# Patient Record
Sex: FEMALE | Race: WHITE | URBAN - METROPOLITAN AREA
[De-identification: names, ages, dates, MRNs, and addresses within clinical notes are randomized per-mention and may not be internally consistent; named-entity substitution may affect disease eponyms.]

---

## 2023-07-14 ENCOUNTER — OFFICE (OUTPATIENT)
Dept: URBAN - METROPOLITAN AREA PATHOLOGY 4 | Facility: PATHOLOGY | Age: 68
End: 2023-07-14

## 2023-07-14 ENCOUNTER — ON CAMPUS - OUTPATIENT (OUTPATIENT)
Dept: URBAN - METROPOLITAN AREA HOSPITAL 2 | Facility: HOSPITAL | Age: 68
End: 2023-07-14

## 2023-07-14 VITALS
SYSTOLIC BLOOD PRESSURE: 122 MMHG | HEIGHT: 56 IN | SYSTOLIC BLOOD PRESSURE: 113 MMHG | DIASTOLIC BLOOD PRESSURE: 70 MMHG | SYSTOLIC BLOOD PRESSURE: 149 MMHG | HEART RATE: 77 BPM | DIASTOLIC BLOOD PRESSURE: 77 MMHG | TEMPERATURE: 97.1 F | DIASTOLIC BLOOD PRESSURE: 92 MMHG | RESPIRATION RATE: 18 BRPM | WEIGHT: 113 LBS | OXYGEN SATURATION: 100 % | HEART RATE: 78 BPM | SYSTOLIC BLOOD PRESSURE: 148 MMHG | HEART RATE: 68 BPM | OXYGEN SATURATION: 99 % | RESPIRATION RATE: 19 BRPM | DIASTOLIC BLOOD PRESSURE: 63 MMHG | SYSTOLIC BLOOD PRESSURE: 123 MMHG | DIASTOLIC BLOOD PRESSURE: 67 MMHG | DIASTOLIC BLOOD PRESSURE: 87 MMHG | DIASTOLIC BLOOD PRESSURE: 81 MMHG | SYSTOLIC BLOOD PRESSURE: 107 MMHG | HEART RATE: 79 BPM | HEART RATE: 84 BPM | RESPIRATION RATE: 169 BRPM | HEART RATE: 76 BPM | DIASTOLIC BLOOD PRESSURE: 64 MMHG | SYSTOLIC BLOOD PRESSURE: 126 MMHG | HEART RATE: 66 BPM | HEART RATE: 67 BPM

## 2023-07-14 DIAGNOSIS — K20.0 EOSINOPHILIC ESOPHAGITIS: ICD-10-CM

## 2023-07-14 DIAGNOSIS — K31.9 DISEASE OF STOMACH AND DUODENUM, UNSPECIFIED: ICD-10-CM

## 2023-07-14 DIAGNOSIS — K29.80 DUODENITIS WITHOUT BLEEDING: ICD-10-CM

## 2023-07-14 DIAGNOSIS — D12.5 BENIGN NEOPLASM OF SIGMOID COLON: ICD-10-CM

## 2023-07-14 DIAGNOSIS — K44.9 DIAPHRAGMATIC HERNIA WITHOUT OBSTRUCTION OR GANGRENE: ICD-10-CM

## 2023-07-14 DIAGNOSIS — K22.2 ESOPHAGEAL OBSTRUCTION: ICD-10-CM

## 2023-07-14 DIAGNOSIS — Z86.010 PERSONAL HISTORY OF COLONIC POLYPS: ICD-10-CM

## 2023-07-14 DIAGNOSIS — R13.10 DYSPHAGIA, UNSPECIFIED: ICD-10-CM

## 2023-07-14 DIAGNOSIS — K31.89 OTHER DISEASES OF STOMACH AND DUODENUM: ICD-10-CM

## 2023-07-14 DIAGNOSIS — K26.9 DUODENAL ULCER, UNSPECIFIED AS ACUTE OR CHRONIC, WITHOUT HEM: ICD-10-CM

## 2023-07-14 PROBLEM — K63.5 POLYP OF COLON: Status: ACTIVE | Noted: 2023-07-14

## 2023-07-14 PROBLEM — K22.89 OTHER SPECIFIED DISEASE OF ESOPHAGUS: Status: ACTIVE | Noted: 2023-07-14

## 2023-07-14 LAB
GI HISTOLOGY: A. UNSPECIFIED: (no result)
GI HISTOLOGY: B. SELECT: (no result)
GI HISTOLOGY: C. UNSPECIFIED: (no result)
GI HISTOLOGY: D. UNSPECIFIED: (no result)
GI HISTOLOGY: PDF REPORT: (no result)

## 2023-07-14 PROCEDURE — 45385 COLONOSCOPY W/LESION REMOVAL: CPT | Mod: PT | Performed by: INTERNAL MEDICINE

## 2023-07-14 PROCEDURE — 88305 TISSUE EXAM BY PATHOLOGIST: CPT | Mod: 26 | Performed by: INTERNAL MEDICINE

## 2023-07-14 PROCEDURE — 43450 DILATE ESOPHAGUS 1/MULT PASS: CPT | Performed by: INTERNAL MEDICINE

## 2023-07-14 PROCEDURE — 43239 EGD BIOPSY SINGLE/MULTIPLE: CPT | Performed by: INTERNAL MEDICINE

## 2023-07-14 RX ORDER — OMEPRAZOLE 40 MG/1
40 CAPSULE, DELAYED RELEASE ORAL
Qty: 30 | Refills: 11 | Status: ACTIVE
Start: 2023-07-14

## 2023-08-01 ENCOUNTER — APPOINTMENT (OUTPATIENT)
Dept: GENERAL RADIOLOGY | Facility: HOSPITAL | Age: 68
End: 2023-08-01
Payer: MEDICARE

## 2023-08-01 ENCOUNTER — HOSPITAL ENCOUNTER (OUTPATIENT)
Facility: HOSPITAL | Age: 68
Discharge: HOME | End: 2023-08-01
Attending: PEDIATRICS | Admitting: PEDIATRICS
Payer: MEDICARE

## 2023-08-01 VITALS
BODY MASS INDEX: 24.98 KG/M2 | WEIGHT: 119 LBS | RESPIRATION RATE: 18 BRPM | SYSTOLIC BLOOD PRESSURE: 134 MMHG | HEIGHT: 58 IN | HEART RATE: 72 BPM | OXYGEN SATURATION: 97 % | DIASTOLIC BLOOD PRESSURE: 75 MMHG | TEMPERATURE: 98.4 F

## 2023-08-01 DIAGNOSIS — M79.671 RIGHT FOOT PAIN: Primary | ICD-10-CM

## 2023-08-01 PROCEDURE — 73620 X-RAY EXAM OF FOOT: CPT

## 2023-08-01 PROCEDURE — G0463 HOSPITAL OUTPT CLINIC VISIT: HCPCS | Performed by: PEDIATRICS

## 2023-08-01 PROCEDURE — 73630 X-RAY EXAM OF FOOT: CPT

## 2023-08-01 RX ORDER — IBUPROFEN 600 MG/1
600 TABLET ORAL EVERY 6 HOURS PRN
Qty: 40 TABLET | Refills: 0 | Status: SHIPPED | OUTPATIENT
Start: 2023-08-01 | End: 2023-08-11

## 2023-08-01 NOTE — DISCHARGE INSTRUCTIONS
Catie Jaquez can safely take 1000 mg of acetaminophen (Tylenol) and 600 mg of ibuprofen (Motrin) every six hours as needed.

## 2023-08-01 NOTE — FSED PROVIDER NOTE
Emergency Medicine Evaluation Note  Subjective   History of Present Illness    HPI: Catie Jaquez is a 67 y.o. female who presents to the ED with presents to the ED with complaints of right foot pain that began gradual without specific inciting event roughly a week prior to presentation.  Patient states injury of left lower extremity roughly a month prior, states has been bearing more weight on the right than typical, states pain at top of right foot, tender to palpation, worse with weightbearing, better with rest.  No attempted OTC medications prior to arrival.  Denies any direct trauma to foot. No other concomitant symptoms. No other known aggravating or alleviating factors.      ROS  Review of Systems   Constitutional: Negative.    HENT: Negative.     Eyes: Negative.    Respiratory: Negative.     Cardiovascular: Negative.    Gastrointestinal: Negative.    Endocrine: Negative.    Genitourinary: Negative.    Musculoskeletal:         As described above   Skin: Negative.    Allergic/Immunologic: Negative.    Neurological: Negative.    Hematological: Negative.    Psychiatric/Behavioral: Negative.       Previous History:  No past medical history on file.  No past surgical history on file.  Social History     Tobacco Use    Smoking status: Never     Passive exposure: Never    Smokeless tobacco: Never     No family history on file.  Allergies   Allergen Reactions    Flexeril [Cyclobenzaprine] Rash     Current Outpatient Medications   Medication Instructions    amLODIPine (NORVASC) 5 MG tablet TAKE ONE (1) TABLET BY MOUTH EVERY DAY    atorvastatin (LIPITOR) 40 mg, Oral, Nightly    benzonatate (TESSALON) 200 mg, Oral, 3 Times Daily PRN    cetirizine (ZYRTEC) 10 mg, Oral, Daily    hydrOXYzine (ATARAX) 12.5-25 mg, Oral, Nightly PRN, for sleep    ibuprofen (ADVIL,MOTRIN) 600 mg, Oral, Every 6 Hours PRN    meloxicam (MOBIC) 7.5 MG tablet TAKE ONE (1) CAPSULE BY MOUTH TWICE DAILY    valACYclovir (VALTREX) 500 MG tablet TAKE  "TWO (2) TABLETS BY MOUTH TWICE DAILY       Objective   Physical Exam  Patient Vitals for the past 24 hrs:   BP Temp Temp src Pulse Resp SpO2 Height Weight   08/01/23 0857 134/75 98.4 øF (36.9 øC) Oral 72 18 97 % 147.3 cm (58\") 54 kg (119 lb)     Physical Exam  Vitals and nursing note reviewed.   Constitutional:       General: She is not in acute distress.     Appearance: She is normal weight. She is not toxic-appearing.   HENT:      Head: Normocephalic and atraumatic.      Nose: Nose normal. No congestion.      Mouth/Throat:      Mouth: Mucous membranes are moist.      Pharynx: Oropharynx is clear.   Eyes:      General:         Right eye: No discharge.         Left eye: No discharge.      Extraocular Movements: Extraocular movements intact.      Conjunctiva/sclera: Conjunctivae normal.   Cardiovascular:      Rate and Rhythm: Normal rate and regular rhythm.      Pulses: Normal pulses.      Heart sounds: Normal heart sounds.   Pulmonary:      Effort: Pulmonary effort is normal.      Breath sounds: Normal breath sounds.   Abdominal:      General: Abdomen is flat. There is no distension.      Palpations: Abdomen is soft.      Tenderness: There is no abdominal tenderness. There is no right CVA tenderness, left CVA tenderness, guarding or rebound.   Musculoskeletal:         General: No swelling or deformity. Normal range of motion.      Cervical back: Normal range of motion and neck supple.      Right lower leg: No edema.      Left lower leg: No edema.      Comments: Tenderness to palpation over right midfoot dorsal surface over proximal first/second meta tarsal/cuneiform bones.  Minimal overlying induration without erythema.  No streaking, no crepitus, compartments soft.  No tenderness over bottom of foot, no tenderness over other bony prominences.  Distal neurovascular intact.   Skin:     Capillary Refill: Capillary refill takes less than 2 seconds.   Neurological:      General: No focal deficit present.      Mental " Status: She is alert and oriented to person, place, and time.      Cranial Nerves: No cranial nerve deficit.      Sensory: No sensory deficit.      Motor: No weakness.      Coordination: Coordination normal.      Gait: Gait normal.      Deep Tendon Reflexes: Reflexes normal.   Psychiatric:         Mood and Affect: Mood normal.         Behavior: Behavior normal.       Results  Labs Reviewed - No data to display  XR Foot 2 View Right   Final Result   Impression:   Negative for Lisfranc widening on weightbearing images.         Electronically Signed: Joshua Elkins MD     8/1/2023 10:37 AM EDT     Workstation ID: [id number]      XR Foot 3+ View Right   Final Result   Impression:   Mild dorsal soft tissue swelling without acute osseous finding. Osseous demineralization noted.         Electronically Signed: Joshua Elkins MD     8/1/2023 9:39 AM EDT     Workstation ID: [id number]        The laboratory results, imaging results and other diagnostic exam results were reviewed in the EMR.     Procedures  Procedures    Medical Decision Making    Patient presents to the ED as described above.  Vital signs stable and unremarkable.  Physical exam as described above.  Imaging initially obtained nonweightbearing films, no acute injury, however given location of pain, weightbearing films obtained without evidence of Lisfranc separation.  Patient treated symptomatically with Ace wrap, postop shoe, discussed safe use of prescribed and OTC medications for symptomatic care as an outpatient. Patient was informed of results.  They verbalized understanding and agreement with treatment care plan.  Given strict return precautions.  All questions answered.    Diagnosis  Final diagnoses:   Right foot pain       Disposition  ED Disposition       ED Disposition   Discharge    Condition   Stable    Comment   --             Juanis Camacho, APRN  [address] [facility] [address]  [place] [address]  [phone]    Call in 3 days  contunity  of care    Asa Miles MD  [address] [place] [address]  [phone]    Call in 1 week  contunity of care, as needed    [place] [address] [address] [place] [id number] [phone]  Go to   As needed, If symptoms worsen

## 2023-10-20 ENCOUNTER — OFFICE (OUTPATIENT)
Dept: URBAN - METROPOLITAN AREA CLINIC 64 | Facility: CLINIC | Age: 68
End: 2023-10-20

## 2023-10-20 VITALS
HEART RATE: 78 BPM | DIASTOLIC BLOOD PRESSURE: 81 MMHG | WEIGHT: 113 LBS | SYSTOLIC BLOOD PRESSURE: 131 MMHG | HEIGHT: 56 IN

## 2023-10-20 DIAGNOSIS — K20.0 EOSINOPHILIC ESOPHAGITIS: ICD-10-CM

## 2023-10-20 DIAGNOSIS — R13.10 DYSPHAGIA, UNSPECIFIED: ICD-10-CM

## 2023-10-20 PROCEDURE — 99214 OFFICE O/P EST MOD 30 MIN: CPT | Performed by: INTERNAL MEDICINE

## 2024-01-15 ENCOUNTER — TELEMEDICINE (OUTPATIENT)
Dept: FAMILY MEDICINE CLINIC | Facility: TELEHEALTH | Age: 69
End: 2024-01-15

## 2024-01-15 DIAGNOSIS — J11.1 INFLUENZA: Primary | ICD-10-CM

## 2024-01-15 DIAGNOSIS — J11.1 INFLUENZA: ICD-10-CM

## 2024-01-15 RX ORDER — BROMPHENIRAMINE MALEATE, PSEUDOEPHEDRINE HYDROCHLORIDE, AND DEXTROMETHORPHAN HYDROBROMIDE 2; 30; 10 MG/5ML; MG/5ML; MG/5ML
5 SYRUP ORAL 4 TIMES DAILY PRN
Qty: 118 ML | Refills: 0 | Status: SHIPPED | OUTPATIENT
Start: 2024-01-15 | End: 2024-01-25

## 2024-01-15 RX ORDER — BROMPHENIRAMINE MALEATE, PSEUDOEPHEDRINE HYDROCHLORIDE, AND DEXTROMETHORPHAN HYDROBROMIDE 2; 30; 10 MG/5ML; MG/5ML; MG/5ML
5 SYRUP ORAL 4 TIMES DAILY PRN
Qty: 118 ML | Refills: 0 | Status: SHIPPED | OUTPATIENT
Start: 2024-01-15 | End: 2024-01-15 | Stop reason: SDUPTHER

## 2024-01-15 RX ORDER — OSELTAMIVIR PHOSPHATE 75 MG/1
75 CAPSULE ORAL 2 TIMES DAILY
Qty: 10 CAPSULE | Refills: 0 | Status: SHIPPED | OUTPATIENT
Start: 2024-01-15 | End: 2024-01-20

## 2024-01-15 RX ORDER — OSELTAMIVIR PHOSPHATE 75 MG/1
75 CAPSULE ORAL 2 TIMES DAILY
Qty: 10 CAPSULE | Refills: 0 | Status: SHIPPED | OUTPATIENT
Start: 2024-01-15 | End: 2024-01-15 | Stop reason: SDUPTHER

## 2024-01-15 NOTE — PROGRESS NOTES
Chief Complaint   Patient presents with    Cough    Generalized Body Aches       Video Visit Reason:   Free Text Description: Family has flu B and having symptoms. Would like to see about getting tamiflu.  Subjective   Catie Jaquez is a 38 y.o. female.     History of Present Illness  Headache, cough, congestion and body aches with family member testing positive for Flu B. She is wanting to get antiviral medication. Onset of her symptoms was this morning.  Cough  This is a new problem. The current episode started today. The problem has been worsening. The problem occurs every few minutes. Associated symptoms include headaches, myalgias, nasal congestion and postnasal drip. Pertinent negatives include no chills, shortness of breath or wheezing.       The following portions of the patient's history were reviewed and updated as appropriate: allergies, current medications, past medical history, and problem list.      Past Medical History:   Diagnosis Date    Hyperlipidemia     Hypertension      Social History     Socioeconomic History    Marital status:    Tobacco Use    Smoking status: Never     medication documentation: reviewed and updated with patient and   Current Outpatient Medications:     brompheniramine-pseudoephedrine-DM 30-2-10 MG/5ML syrup, Take 5 mL by mouth 4 (Four) Times a Day As Needed for Congestion or Cough for up to 10 days., Disp: 118 mL, Rfl: 0    oseltamivir (Tamiflu) 75 MG capsule, Take 1 capsule by mouth 2 (Two) Times a Day for 5 days., Disp: 10 capsule, Rfl: 0  Review of Systems   Constitutional:  Negative for chills.   HENT:  Positive for postnasal drip.    Respiratory:  Positive for cough. Negative for chest tightness, shortness of breath and wheezing.    Musculoskeletal:  Positive for myalgias.   Neurological:  Positive for headaches.       Objective   Physical Exam  Constitutional:       General: She is not in acute distress.     Appearance: She is ill-appearing.   HENT:      Nose:  Congestion present.   Eyes:      Conjunctiva/sclera: Conjunctivae normal.   Pulmonary:      Effort: Pulmonary effort is normal.      Comments: Cough and hoarseness  Neurological:      Mental Status: She is alert.   Psychiatric:         Mood and Affect: Mood normal.         Assessment & Plan   Diagnoses and all orders for this visit:    1. Influenza (Primary)  -     oseltamivir (Tamiflu) 75 MG capsule; Take 1 capsule by mouth 2 (Two) Times a Day for 5 days.  Dispense: 10 capsule; Refill: 0  -     brompheniramine-pseudoephedrine-DM 30-2-10 MG/5ML syrup; Take 5 mL by mouth 4 (Four) Times a Day As Needed for Congestion or Cough for up to 10 days.  Dispense: 118 mL; Refill: 0                    Follow Up:  If your symptoms are not resolving by the completion of your treatment or are worsening, see your primary care provider for follow up. If you don't have a primary care provider, you may go to any Urgent Care for re-evaluation. If you develop any life threatening symptoms, go to the nearest Emergency Department immediately or call EMS.               The use of  Video Visit was utilized during this visit, using both [employer] and [employer]. The use of a video visit has been reviewed with the patient and verbal informed consent has been obtained. No technical difficulties occurred during the visit.    is located at [address] [place] [address] [place] [address]  Provider is located at [place], [place]

## 2024-01-15 NOTE — PATIENT INSTRUCTIONS
"Influenza, Adult  Influenza is also called \"the flu.\" It is an infection in the lungs, nose, and throat (respiratory tract). It spreads easily from person to person (is contagious). The flu causes symptoms that are like a cold, along with high fever and body aches.  What are the causes?  This condition is caused by the influenza virus. You can get the virus by:  Breathing in droplets that are in the air after a person infected with the flu coughed or sneezed.  Touching something that has the virus on it and then touching your mouth, nose, or eyes.  What increases the risk?  Certain things may make you more likely to get the flu. These include:  Not washing your hands often.  Having close contact with many people during cold and flu season.  Touching your mouth, eyes, or nose without first washing your hands.  Not getting a flu shot every year.  You may have a higher risk for the flu, and serious problems, such as a lung infection (pneumonia), if you:  Are older than 65.  Are pregnant.  Have a weakened disease-fighting system (immune system) because of a disease or because you are taking certain medicines.  Have a long-term (chronic) condition, such as:  Heart, kidney, or lung disease.  Diabetes.  Asthma.  Have a liver disorder.  Are very overweight (morbidly obese).  Have anemia.  What are the signs or symptoms?  Symptoms usually begin suddenly and last 4-14 days. They may include:  Fever and chills.  Headaches, body aches, or muscle aches.  Sore throat.  Cough.  Runny or stuffy (congested) nose.  Feeling discomfort in your chest.  Not wanting to eat as much as normal.  Feeling weak or tired.  Feeling dizzy.  Feeling sick to your stomach or throwing up.  How is this treated?  If the flu is found early, you can be treated with antiviral medicine. This can help to reduce how bad the illness is and how long it lasts. This may be given by mouth or through an IV tube.  Taking care of yourself at home can help your " symptoms get better. Your doctor may want you to:  Take over-the-counter medicines.  Drink plenty of fluids.  The flu often goes away on its own. If you have very bad symptoms or other problems, you may be treated in a hospital.  Follow these instructions at home:         Activity  Rest as needed. Get plenty of sleep.  Stay home from work or school as told by your doctor.  Do not leave home until you do not have a fever for 24 hours without taking medicine.  Leave home only to go to your doctor.  Eating and drinking  Take an ORS (oral rehydration solution). This is a drink that is sold at pharmacies and stores.  Drink enough fluid to keep your pee pale yellow.  Drink clear fluids in small amounts as you are able. Clear fluids include:  Water.  Ice chips.  Fruit juice mixed with water.  Low-calorie sports drinks.  Eat bland foods that are easy to digest. Eat small amounts as you are able. These foods include:  Bananas.  Applesauce.  Rice.  Lean meats.  Toast.  Crackers.  Do not eat or drink:  Fluids that have a lot of sugar or caffeine.  Alcohol.  Spicy or fatty foods.  General instructions  Take over-the-counter and prescription medicines only as told by your doctor.  Use a cool mist humidifier to add moisture to the air in your home. This can make it easier for you to breathe.  When using a cool mist humidifier, clean it daily. Empty water and replace with clean water.  Cover your mouth and nose when you cough or sneeze.  Wash your hands with soap and water often and for at least 20 seconds. This is also important after you cough or sneeze. If you cannot use soap and water, use alcohol-based hand .  Keep all follow-up visits.  How is this prevented?    Get a flu shot every year. You may get the flu shot in late summer, fall, or winter. Ask your doctor when you should get your flu shot.  Avoid contact with people who are sick during fall and winter. This is cold and flu season.  Contact a doctor if:  You  "get new symptoms.  You have:  Chest pain.  Watery poop (diarrhea).  A fever.  Your cough gets worse.  You start to have more mucus.  You feel sick to your stomach.  You throw up.  Get help right away if you:  Have shortness of breath.  Have trouble breathing.  Have skin or nails that turn a bluish color.  Have very bad pain or stiffness in your neck.  Get a sudden headache.  Get sudden pain in your face or ear.  Cannot eat or drink without throwing up.  These symptoms may represent a serious problem that is an emergency. Get medical help right away. Call your local emergency services (911 in the [place]  Do not wait to see if the symptoms will go away.  Do not drive yourself to the hospital.  Summary  Influenza is also called \"the flu.\" It is an infection in the lungs, nose, and throat. It spreads easily from person to person.  Take over-the-counter and prescription medicines only as told by your doctor.  Getting a flu shot every year is the best way to not get the flu.  This information is not intended to replace advice given to you by your health care provider. Make sure you discuss any questions you have with your health care provider.  Document Revised: 08/06/2021 Document Reviewed: 08/06/2021  Elsevier Patient Education © 2023 [employer]     "

## 2024-12-30 ENCOUNTER — APPOINTMENT (OUTPATIENT)
Dept: GENERAL RADIOLOGY | Facility: HOSPITAL | Age: 69
End: 2024-12-30
Payer: MEDICARE

## 2024-12-30 ENCOUNTER — HOSPITAL ENCOUNTER (OUTPATIENT)
Facility: HOSPITAL | Age: 69
Discharge: HOME | End: 2024-12-30
Attending: EMERGENCY MEDICINE | Admitting: EMERGENCY MEDICINE
Payer: MEDICARE

## 2024-12-30 VITALS
HEART RATE: 95 BPM | TEMPERATURE: 98.4 F | BODY MASS INDEX: 30.01 KG/M2 | DIASTOLIC BLOOD PRESSURE: 88 MMHG | WEIGHT: 133.4 LBS | RESPIRATION RATE: 18 BRPM | SYSTOLIC BLOOD PRESSURE: 165 MMHG | HEIGHT: 56 IN | OXYGEN SATURATION: 98 %

## 2024-12-30 DIAGNOSIS — R05.9 COUGH IN ADULT PATIENT: Primary | ICD-10-CM

## 2024-12-30 LAB
FLUAV SUBTYP SPEC NAA+PROBE: NOT DETECTED
FLUBV RNA ISLT QL NAA+PROBE: NOT DETECTED
RSV RNA NPH QL NAA+NON-PROBE: NOT DETECTED
SARS-COV-2 RNA RESP QL NAA+PROBE: NOT DETECTED

## 2024-12-30 PROCEDURE — 87637 SARSCOV2&INF A&B&RSV AMP PRB: CPT

## 2024-12-30 PROCEDURE — 71045 X-RAY EXAM CHEST 1 VIEW: CPT

## 2024-12-30 PROCEDURE — G0463 HOSPITAL OUTPT CLINIC VISIT: HCPCS

## 2024-12-30 RX ORDER — GUAIFENESIN 600 MG/1
600 TABLET, EXTENDED RELEASE ORAL 2 TIMES DAILY
Qty: 14 TABLET | Refills: 0 | Status: SHIPPED | OUTPATIENT
Start: 2024-12-30 | End: 2025-01-06

## 2024-12-30 RX ORDER — AZITHROMYCIN 250 MG/1
TABLET, FILM COATED ORAL
Qty: 6 TABLET | Refills: 0 | Status: SHIPPED | OUTPATIENT
Start: 2024-12-30

## 2024-12-30 RX ORDER — BENZONATATE 200 MG/1
200 CAPSULE ORAL 3 TIMES DAILY PRN
Qty: 21 CAPSULE | Refills: 0 | Status: SHIPPED | OUTPATIENT
Start: 2024-12-30 | End: 2025-01-06

## 2024-12-30 NOTE — FSED PROVIDER NOTE
Subjective   History of Present Illness  69-year-old female reports she has had a cough for 2 months reports over the last several days the cough has worsened.  Denies chest pain shortness of breath vomiting and diarrhea reports family members have been sick with similar symptoms.  Patient is asking for an antibiotic.  Patient denies that she is a smoker.        Review of Systems   All other systems reviewed and are negative.      Past Medical History:   Diagnosis Date    Hyperlipidemia     Hypertension        Allergies   Allergen Reactions    Flexeril [Cyclobenzaprine] Rash       No past surgical history on file.    No family history on file.    Social History     Socioeconomic History    Marital status:    Tobacco Use    Smoking status: Never     Passive exposure: Never    Smokeless tobacco: Never           Objective   Physical Exam  Vitals reviewed.   Constitutional:       General: She is not in acute distress.     Appearance: Normal appearance. She is not toxic-appearing.   HENT:      Head: Normocephalic.      Right Ear: Tympanic membrane normal.      Nose: Nose normal.      Mouth/Throat:      Mouth: Mucous membranes are moist.   Eyes:      Extraocular Movements: Extraocular movements intact.      Conjunctiva/sclera: Conjunctivae normal.      Pupils: Pupils are equal, round, and reactive to light.   Cardiovascular:      Rate and Rhythm: Normal rate.      Pulses: Normal pulses.   Pulmonary:      Effort: Pulmonary effort is normal. No respiratory distress.      Breath sounds: Normal breath sounds. No stridor. No wheezing or rhonchi.   Abdominal:      General: Abdomen is flat. Bowel sounds are normal.      Palpations: Abdomen is soft.   Musculoskeletal:      Cervical back: Normal range of motion.   Skin:     General: Skin is warm.      Capillary Refill: Capillary refill takes less than 2 seconds.   Neurological:      General: No focal deficit present.      Mental Status: She is alert and oriented to person,  place, and time. Mental status is at baseline.         Procedures           ED Course  ED Course as of 12/30/24 1129   Mon Dec 30, 2024   0855 COVID influenza not detected [WF]   0911 Chest x-ray  Impression:  Dependent opacities most compatible with atelectasis, accentuated by low lung volumes.   [WF]      ED Course User Index  [WF] Rainer House Jr., APRN                                           Medical Decision Making  COVID influenza RSV not detected    Chest x-ray is negative for focal consolidation but does show atelectasis    Given patient's 1 to 2-month history of cough and worsening symptoms will discharge home on azithromycin and Tessalon Perles Mucinex    Respiratory panel offered however patient prefers treatment    Problems Addressed:  Cough in adult patient: complicated acute illness or injury    Amount and/or Complexity of Data Reviewed  Labs: ordered.  Radiology: ordered.    Risk  OTC drugs.  Prescription drug management.        Final diagnoses:   Cough in adult patient       ED Disposition  ED Disposition       ED Disposition   Discharge    Condition   Stable    Comment   --               Zuleyma Miller, APRN  [address] [place] [address] [phone]               Medication List        New Prescriptions      azithromycin 250 MG tablet  Commonly known as: Zithromax Z-Dc  Take 2 tablets by mouth on day 1, then 1 tablet daily on days 2-5     guaiFENesin 600 MG 12 hr tablet  Commonly known as: MUCINEX  Take 1 tablet by mouth 2 (Two) Times a Day for 7 days.               Where to Get Your Medications        These medications were sent to [facility] [id number] - [place], [place] - [address] [facility] [address] [facility]  - [phone]  - [phone] FX  [address] [facility] [address] [facility] [place] [address]      Phone: [phone]   azithromycin 250 MG tablet  benzonatate 200 MG capsule  guaiFENesin 600 MG 12 hr tablet

## 2024-12-30 NOTE — DISCHARGE INSTRUCTIONS
Alternate Tylenol or ibuprofen as needed for body aches and fever    Begin Z-Dc to help with cough    Take Tessalon Perles and Mucinex to help with cough    Follow-up with your family doctor for recheck within the next week return to ER for worsening symptom